# Patient Record
Sex: MALE | Race: WHITE | Employment: UNEMPLOYED | URBAN - METROPOLITAN AREA
[De-identification: names, ages, dates, MRNs, and addresses within clinical notes are randomized per-mention and may not be internally consistent; named-entity substitution may affect disease eponyms.]

---

## 2021-04-24 ENCOUNTER — HOSPITAL ENCOUNTER (INPATIENT)
Age: 61
LOS: 2 days | Discharge: HOME | DRG: 881 | End: 2021-04-26
Attending: PSYCHIATRY & NEUROLOGY | Admitting: PSYCHIATRY & NEUROLOGY

## 2021-04-24 PROBLEM — F32.9 MAJOR DEPRESSIVE DISORDER: Status: ACTIVE | Noted: 2021-04-24

## 2021-04-24 PROCEDURE — 65220000003 HC RM SEMIPRIVATE PSYCH

## 2021-04-24 PROCEDURE — 74011250637 HC RX REV CODE- 250/637: Performed by: NURSE PRACTITIONER

## 2021-04-24 RX ORDER — HYDROXYZINE 50 MG/1
50 TABLET, FILM COATED ORAL
Status: DISCONTINUED | OUTPATIENT
Start: 2021-04-24 | End: 2021-04-26 | Stop reason: HOSPADM

## 2021-04-24 RX ORDER — LISINOPRIL 40 MG/1
40 TABLET ORAL DAILY
COMMUNITY
End: 2021-04-26 | Stop reason: HOSPADM

## 2021-04-24 RX ORDER — OLANZAPINE 5 MG/1
5 TABLET ORAL
Status: DISCONTINUED | OUTPATIENT
Start: 2021-04-24 | End: 2021-04-26 | Stop reason: HOSPADM

## 2021-04-24 RX ORDER — BENZTROPINE MESYLATE 1 MG/1
1 TABLET ORAL
Status: DISCONTINUED | OUTPATIENT
Start: 2021-04-24 | End: 2021-04-26 | Stop reason: HOSPADM

## 2021-04-24 RX ORDER — METOPROLOL SUCCINATE 25 MG/1
25 TABLET, EXTENDED RELEASE ORAL DAILY
Status: DISCONTINUED | OUTPATIENT
Start: 2021-04-25 | End: 2021-04-26 | Stop reason: HOSPADM

## 2021-04-24 RX ORDER — LANOLIN ALCOHOL/MO/W.PET/CERES
100 CREAM (GRAM) TOPICAL DAILY
Status: DISCONTINUED | OUTPATIENT
Start: 2021-04-25 | End: 2021-04-26 | Stop reason: HOSPADM

## 2021-04-24 RX ORDER — PHENOBARBITAL 32.4 MG/1
32.4 TABLET ORAL 4 TIMES DAILY
Status: COMPLETED | OUTPATIENT
Start: 2021-04-24 | End: 2021-04-25

## 2021-04-24 RX ORDER — ASPIRIN 325 MG
325 TABLET ORAL DAILY
Status: DISCONTINUED | OUTPATIENT
Start: 2021-04-25 | End: 2021-04-26 | Stop reason: HOSPADM

## 2021-04-24 RX ORDER — METOPROLOL TARTRATE 100 MG/1
100 TABLET ORAL DAILY
COMMUNITY
End: 2021-04-26 | Stop reason: HOSPADM

## 2021-04-24 RX ORDER — CLONAZEPAM 0.5 MG/1
0.5 TABLET ORAL
COMMUNITY
End: 2021-04-26 | Stop reason: HOSPADM

## 2021-04-24 RX ORDER — ADHESIVE BANDAGE
30 BANDAGE TOPICAL DAILY PRN
Status: DISCONTINUED | OUTPATIENT
Start: 2021-04-24 | End: 2021-04-26 | Stop reason: HOSPADM

## 2021-04-24 RX ORDER — PHENOBARBITAL 32.4 MG/1
32.4 TABLET ORAL
Status: ACTIVE | OUTPATIENT
Start: 2021-04-24 | End: 2021-04-26

## 2021-04-24 RX ORDER — TADALAFIL 10 MG/1
10 TABLET ORAL DAILY
COMMUNITY
End: 2021-04-26 | Stop reason: HOSPADM

## 2021-04-24 RX ORDER — PANTOPRAZOLE SODIUM 40 MG/1
40 TABLET, DELAYED RELEASE ORAL
Status: DISCONTINUED | OUTPATIENT
Start: 2021-04-25 | End: 2021-04-26 | Stop reason: HOSPADM

## 2021-04-24 RX ORDER — HALOPERIDOL 5 MG/ML
5 INJECTION INTRAMUSCULAR
Status: DISCONTINUED | OUTPATIENT
Start: 2021-04-24 | End: 2021-04-26 | Stop reason: HOSPADM

## 2021-04-24 RX ORDER — IBUPROFEN 200 MG
1 TABLET ORAL DAILY
Status: DISCONTINUED | OUTPATIENT
Start: 2021-04-25 | End: 2021-04-26 | Stop reason: HOSPADM

## 2021-04-24 RX ORDER — PHENOBARBITAL 32.4 MG/1
32.4 TABLET ORAL 2 TIMES DAILY
Status: COMPLETED | OUTPATIENT
Start: 2021-04-25 | End: 2021-04-26

## 2021-04-24 RX ORDER — HYDROCHLOROTHIAZIDE 25 MG/1
12.5 TABLET ORAL DAILY
Status: DISCONTINUED | OUTPATIENT
Start: 2021-04-25 | End: 2021-04-26 | Stop reason: HOSPADM

## 2021-04-24 RX ORDER — ACETAMINOPHEN 325 MG/1
650 TABLET ORAL
Status: DISCONTINUED | OUTPATIENT
Start: 2021-04-24 | End: 2021-04-26 | Stop reason: HOSPADM

## 2021-04-24 RX ORDER — THERA TABS 400 MCG
1 TAB ORAL DAILY
Status: DISCONTINUED | OUTPATIENT
Start: 2021-04-25 | End: 2021-04-26 | Stop reason: HOSPADM

## 2021-04-24 RX ORDER — DIAZEPAM 5 MG/1
5 TABLET ORAL
Status: DISCONTINUED | OUTPATIENT
Start: 2021-04-24 | End: 2021-04-25

## 2021-04-24 RX ORDER — FOLIC ACID 1 MG/1
1 TABLET ORAL DAILY
Status: DISCONTINUED | OUTPATIENT
Start: 2021-04-25 | End: 2021-04-26 | Stop reason: HOSPADM

## 2021-04-24 RX ORDER — PHENOBARBITAL 32.4 MG/1
16.2 TABLET ORAL
Status: DISCONTINUED | OUTPATIENT
Start: 2021-04-26 | End: 2021-04-26 | Stop reason: HOSPADM

## 2021-04-24 RX ORDER — DIPHENHYDRAMINE HYDROCHLORIDE 50 MG/ML
50 INJECTION, SOLUTION INTRAMUSCULAR; INTRAVENOUS
Status: DISCONTINUED | OUTPATIENT
Start: 2021-04-24 | End: 2021-04-26 | Stop reason: HOSPADM

## 2021-04-24 RX ORDER — LISINOPRIL 20 MG/1
20 TABLET ORAL DAILY
Status: DISCONTINUED | OUTPATIENT
Start: 2021-04-25 | End: 2021-04-25

## 2021-04-24 RX ORDER — LORAZEPAM 2 MG/ML
1 INJECTION INTRAMUSCULAR
Status: DISCONTINUED | OUTPATIENT
Start: 2021-04-24 | End: 2021-04-26 | Stop reason: HOSPADM

## 2021-04-24 RX ORDER — CALCIUM CARBONATE 200(500)MG
200 TABLET,CHEWABLE ORAL
Status: DISCONTINUED | OUTPATIENT
Start: 2021-04-24 | End: 2021-04-26 | Stop reason: HOSPADM

## 2021-04-24 RX ORDER — HYDROCHLOROTHIAZIDE 25 MG/1
25 TABLET ORAL DAILY
COMMUNITY
End: 2021-04-26 | Stop reason: HOSPADM

## 2021-04-24 RX ORDER — TRAZODONE HYDROCHLORIDE 50 MG/1
50 TABLET ORAL
Status: DISCONTINUED | OUTPATIENT
Start: 2021-04-24 | End: 2021-04-26 | Stop reason: HOSPADM

## 2021-04-24 RX ORDER — PHENOBARBITAL 32.4 MG/1
16.2 TABLET ORAL 2 TIMES DAILY
Status: DISCONTINUED | OUTPATIENT
Start: 2021-04-26 | End: 2021-04-26 | Stop reason: HOSPADM

## 2021-04-24 RX ADMIN — HYDROXYZINE HYDROCHLORIDE 50 MG: 50 TABLET, FILM COATED ORAL at 13:53

## 2021-04-24 RX ADMIN — PHENOBARBITAL 32.4 MG: 32.4 TABLET ORAL at 22:03

## 2021-04-24 RX ADMIN — PHENOBARBITAL 32.4 MG: 32.4 TABLET ORAL at 17:57

## 2021-04-24 RX ADMIN — PHENOBARBITAL 32.4 MG: 32.4 TABLET ORAL at 13:53

## 2021-04-24 RX ADMIN — HYDROXYZINE HYDROCHLORIDE 50 MG: 50 TABLET, FILM COATED ORAL at 17:59

## 2021-04-24 RX ADMIN — OLANZAPINE 5 MG: 5 TABLET, FILM COATED ORAL at 17:59

## 2021-04-24 NOTE — BH NOTES
1220- Pt arrives to the unit alert oriented. Demanding irritable. Primary Nurse Wesley Borges and Amarilis Mariee, RN performed a dual skin assessment on this patient No impairment noted  Nick score is 23    1232- pt states he need ativan or librium. Education provided. Pt states he needs to be discharged. Education provided related to detox. Pt states he will try detox. Order to have pt assessed if pt wants to leave. Pt on CIWA monitoring. Voluntary. 1440- Hospitalist will see pt. Allegra Mckeon MD.     Regine Salcedo from ED on pt paper chart.

## 2021-04-24 NOTE — PROGRESS NOTES
Problem: Depressed Mood (Adult/Pediatric)  Goal: *STG: Verbalizes anger, guilt, and other feelings in a constructive manor  Outcome: Not Progressing Towards Goal  Goal: *STG: Attends activities and groups  Outcome: Not Progressing Towards Goal  Goal: *STG: Demonstrates reduction in symptoms and increase in insight into coping skills/future focused  Outcome: Not Progressing Towards Goal    1630: Patient received awake and lying quietly in the bed. Mood and affect; dull, flat, however cooperative. Patient has remained isolative to room majority of the shift, and voiced complaints of not feeling well and \"shaky\". Writer educated patient on benefits of taking Phenobarbital to alleviate withdrawal symptoms. Denies SI/HI. Will continue to monitor q15 minutes for safety checks.

## 2021-04-24 NOTE — BH NOTES
TRANSFER - IN REPORT:    Verbal report received from ROGER Courtney(name) on Enrique Roberto  being received from [address]) for routine progression of care      Report consisted of patients Situation, Background, Assessment and   Recommendations(SBAR). Information from the following report(s) SBAR was reviewed with the receiving nurse. Opportunity for questions and clarification was provided. Assessment completed upon patients arrival to unit and care assumed.

## 2021-04-24 NOTE — CONSULTS
[facility]    H[facility] Consult  Primary Care Provider: None  Consult requested by: Cha Ibarra NP    Subjective:     Jaspreet Roberts is a 63 y.o. male who presents for admission to the inpatient behavioral health unit for major depression DO, alcohol abuse,  and suicidal ideation. He really does not have any complaints for me regarding any medical issues - just mood related complaints- he in interested in leaving in the morning due to inadequate control of his symptoms. He denies check pain or shortness of breath- no N/V, no abd pain, no constipation or diarrhea. He lives in [address] [place] [address] [facility] [employer]. He tells me that his wife kicked him out of the house and has another man living in the house. He admits to vaping and drinking lots of alcohol. Urine UDS from [facility] + for marijuana and methamphetamines. Review of Systems:    A comprehensive review of systems was negative except for that written in the History of Present Illness. PMH- per records- HTN, tobacco and Etoh abuse, Back pain, erectile dysfunction, nasal polyps, past COVID infection 7/2020, past traumatic fracture of Left tibia- medial malleolus; Past surgical Hx- appendectomy      Prior to Admission medications    Medication Sig Start Date End Date Taking? Authorizing Provider   metoprolol tartrate (LOPRESSOR) 100 mg IR tablet Take 100 mg by mouth daily. Yes Provider, Historical   hydroCHLOROthiazide (HYDRODIURIL) 25 mg tablet Take 25 mg by mouth daily. Yes Provider, Historical   tadalafiL (Cialis) 10 mg tablet Take 10 mg by mouth daily. Yes Provider, Historical   lisinopriL (PRINIVIL, ZESTRIL) 40 mg tablet Take 40 mg by mouth daily. Yes Provider, Historical   clonazePAM (KlonoPIN) 0.5 mg tablet Take 0.5 mg by mouth nightly as needed for Anxiety.  Indications: panic disorder   Yes Provider, Historical     Allergies to Tramadol and propoxyphene    Family Hx- mother with \"mental issues\"       Father with HTN   Siblings healthy  Patient is - wife's name Chelsey Vidal [phone]  He has 6 children from his wife and 2 other children from another person      SOCIAL HISTORY:  Patient resides at [place] [address] in a hotel,  as above  Patient ambulates independently   Smoking history: patient admits to vaping  Alcohol history: recent heavy alcohol use and per UDS- drug use- marajuana and meth    Objective:       Physical Exam:   Visit Vitals  /82   Pulse 88   Temp 98.5 °F (36.9 °C)   Resp 16   SpO2 99%     General:  Alert, cooperative, no distress, appears stated age. Head:  Normocephalic, without obvious abnormality, atraumatic. Eyes:  Conjunctivae/corneas clear. PERRL, EOMs intact. Throat: Lips, mucosa, and tongue normal. Dental caries   Neck: Supple, symmetrical, trachea midline, no adenopathy, thyroid: no enlargement/tenderness/nodules, no carotid bruit and no JVD. Back:   Symmetric, no curvature. ROM normal. No CVA tenderness. Lungs:   Clear to auscultation bilaterally. Chest wall:  No tenderness or deformity. Heart:  Regular rate and rhythm, S1, S2 normal, no murmur, cl. Abdomen:   Soft, non-tender. Mild distension. No masses,    Extremities: Trace LE edema. normal muscle tone no tremors   Pulses: 2+ and symmetric all extremities. Skin: Skin color, texture, turgor normal. No rashes or lesions   Lymph nodes: Cervical, supraclavicular, nodes normal.   Neurologic: CNII-XII intact. Normal strength, sensation  Throughout. Psych: depressed mood and anxious       ECG:  From outside hospital reviewed- NSR with ST segment changes consistent with prior infarcts    Data Review: All diagnostic labs and studies have been reviewed. Labs from outside hospital reviewed- CBC- normal     CMP- showing Na of 135, creatinine elevated 1.4; LFTs and Bilirubin were normal    Troponin was negative    Assessment:   Russ Cam is a 63 y.o. male who presents with MDD and Suicidal ideation.  We are asked to see the patient in consult to assist in managing his medical issues which are listed below. Active Problems:    Major depressive disorder (4/24/2021)        Plan:     1. MDD/ Suicidal ideation- management per behavioral health team  - added valium at patient request for benzo withdrawal symptoms- he was getting ativan prior to admission    2. Etoh abuse - no current evidence for withdrawal- cont phenobarbital as ordered  Check ammonia levels in the am   3. CV- HTN- EKG evidence of past ischemia- resume prior meds:  4. Elevated creatinine 1.4- unclear baseline, resume lisinopril and HCTZ and monitor creatinine  5. Hyponatremia- mild and not needing tx  6.  Nicotine dependence- Vaping prior to admission- nicotine patch      Signed By: Roseanna Barfield MD     Date of Service:  4/24/2021

## 2021-04-24 NOTE — PROGRESS NOTES
Problem: Depressed Mood (Adult/Pediatric)  Goal: *STG: Remains safe in hospital  Outcome: Progressing Towards Goal  Goal: Interventions  Outcome: Progressing Towards Goal     Problem: Anxiety-Behavioral Health (Adult/Pediatric)  Goal: *STG: Seeks staff when feelings of anxiety and fear arise  Outcome: Progressing Towards Goal  Goal: Interventions  Outcome: Progressing Towards Goal

## 2021-04-25 LAB
AMMONIA PLAS-SCNC: 26 UMOL/L
ANION GAP SERPL CALC-SCNC: 6 MMOL/L (ref 5–15)
BUN SERPL-MCNC: 26 MG/DL (ref 6–20)
BUN/CREAT SERPL: 21 (ref 12–20)
CALCIUM SERPL-MCNC: 9.4 MG/DL (ref 8.5–10.1)
CHLORIDE SERPL-SCNC: 109 MMOL/L (ref 97–108)
CHOLEST SERPL-MCNC: 185 MG/DL
CO2 SERPL-SCNC: 25 MMOL/L (ref 21–32)
CREAT SERPL-MCNC: 1.23 MG/DL (ref 0.7–1.3)
ERYTHROCYTE [DISTWIDTH] IN BLOOD BY AUTOMATED COUNT: 13.8 % (ref 11.5–14.5)
GLUCOSE P FAST SERPL-MCNC: 94 MG/DL (ref 65–100)
GLUCOSE SERPL-MCNC: 94 MG/DL (ref 65–100)
HCT VFR BLD AUTO: 41.2 % (ref 36.6–50.3)
HDLC SERPL-MCNC: 36 MG/DL
HDLC SERPL: 5.1 {RATIO} (ref 0–5)
HGB BLD-MCNC: 13.7 G/DL (ref 12.1–17)
LDLC SERPL CALC-MCNC: 114.2 MG/DL (ref 0–100)
LIPID PROFILE,FLP: ABNORMAL
MAGNESIUM SERPL-MCNC: 2.2 MG/DL (ref 1.6–2.4)
MCH RBC QN AUTO: 29.7 PG (ref 26–34)
MCHC RBC AUTO-ENTMCNC: 33.3 G/DL (ref 30–36.5)
MCV RBC AUTO: 89.4 FL (ref 80–99)
NRBC # BLD: 0 K/UL (ref 0–0.01)
NRBC BLD-RTO: 0 PER 100 WBC
PLATELET # BLD AUTO: 299 K/UL (ref 150–400)
PMV BLD AUTO: 9 FL (ref 8.9–12.9)
POTASSIUM SERPL-SCNC: 4 MMOL/L (ref 3.5–5.1)
RBC # BLD AUTO: 4.61 M/UL (ref 4.1–5.7)
SODIUM SERPL-SCNC: 140 MMOL/L (ref 136–145)
TRIGL SERPL-MCNC: 174 MG/DL (ref ?–150)
TSH SERPL DL<=0.05 MIU/L-ACNC: 0.23 UIU/ML (ref 0.36–3.74)
VLDLC SERPL CALC-MCNC: 34.8 MG/DL
WBC # BLD AUTO: 6.3 K/UL (ref 4.1–11.1)

## 2021-04-25 PROCEDURE — 80048 BASIC METABOLIC PNL TOTAL CA: CPT

## 2021-04-25 PROCEDURE — 83735 ASSAY OF MAGNESIUM: CPT

## 2021-04-25 PROCEDURE — 80061 LIPID PANEL: CPT

## 2021-04-25 PROCEDURE — 36415 COLL VENOUS BLD VENIPUNCTURE: CPT

## 2021-04-25 PROCEDURE — 82140 ASSAY OF AMMONIA: CPT

## 2021-04-25 PROCEDURE — 74011250637 HC RX REV CODE- 250/637: Performed by: NURSE PRACTITIONER

## 2021-04-25 PROCEDURE — 85027 COMPLETE CBC AUTOMATED: CPT

## 2021-04-25 PROCEDURE — 65220000003 HC RM SEMIPRIVATE PSYCH

## 2021-04-25 PROCEDURE — 82947 ASSAY GLUCOSE BLOOD QUANT: CPT

## 2021-04-25 PROCEDURE — 74011250637 HC RX REV CODE- 250/637: Performed by: FAMILY MEDICINE

## 2021-04-25 PROCEDURE — 84443 ASSAY THYROID STIM HORMONE: CPT

## 2021-04-25 RX ORDER — LISINOPRIL 20 MG/1
40 TABLET ORAL DAILY
Status: DISCONTINUED | OUTPATIENT
Start: 2021-04-26 | End: 2021-04-26 | Stop reason: HOSPADM

## 2021-04-25 RX ADMIN — HYDROXYZINE HYDROCHLORIDE 50 MG: 50 TABLET, FILM COATED ORAL at 20:55

## 2021-04-25 RX ADMIN — PANTOPRAZOLE SODIUM 40 MG: 40 TABLET, DELAYED RELEASE ORAL at 06:55

## 2021-04-25 RX ADMIN — PHENOBARBITAL 32.4 MG: 32.4 TABLET ORAL at 17:15

## 2021-04-25 RX ADMIN — ASPIRIN 325 MG ORAL TABLET 325 MG: 325 PILL ORAL at 08:00

## 2021-04-25 RX ADMIN — LISINOPRIL 20 MG: 20 TABLET ORAL at 08:01

## 2021-04-25 RX ADMIN — FOLIC ACID 1 MG: 1 TABLET ORAL at 08:00

## 2021-04-25 RX ADMIN — PHENOBARBITAL 32.4 MG: 32.4 TABLET ORAL at 08:01

## 2021-04-25 RX ADMIN — HYDROCHLOROTHIAZIDE 12.5 MG: 25 TABLET ORAL at 08:00

## 2021-04-25 RX ADMIN — HYDROXYZINE HYDROCHLORIDE 50 MG: 50 TABLET, FILM COATED ORAL at 13:13

## 2021-04-25 RX ADMIN — METOPROLOL SUCCINATE 25 MG: 25 TABLET, EXTENDED RELEASE ORAL at 08:01

## 2021-04-25 RX ADMIN — THERA TABS 1 TABLET: TAB at 08:00

## 2021-04-25 RX ADMIN — Medication 100 MG: at 08:00

## 2021-04-25 RX ADMIN — TRAZODONE HYDROCHLORIDE 50 MG: 50 TABLET ORAL at 20:55

## 2021-04-25 RX ADMIN — PANTOPRAZOLE SODIUM 40 MG: 40 TABLET, DELAYED RELEASE ORAL at 16:31

## 2021-04-25 NOTE — PROGRESS NOTES
Problem: Falls - Risk of  Goal: *Absence of Falls  Description: Document Gary Miner Fall Risk and appropriate interventions in the flowsheet. Outcome: Progressing Towards Goal  Note:   Patient received resting in bed with eyes closed. No complaints, No distress noted. Safety measures in place. Will continue to monitor with q15min rounds.         Fall Risk Interventions:            Medication Interventions: Teach patient to arise slowly

## 2021-04-25 NOTE — PROGRESS NOTES
Problem: Depressed Mood (Adult/Pediatric)  Goal: *STG: Participates in treatment plan  Outcome: Progressing Towards Goal  Goal: *STG: Remains safe in hospital  Outcome: Progressing Towards Goal  Goal: Interventions  Outcome: Progressing Towards Goal     Problem: Patient Education: Go to Patient Education Activity  Goal: Patient/Family Education  Outcome: Progressing Towards Goal

## 2021-04-25 NOTE — PROGRESS NOTES
Problem: Depressed Mood (Adult/Pediatric)  Goal: Interventions  Outcome: Progressing Towards Goal  Note: CIWA monitoring. Medication meal compliant. Labile mood. Not attending groups. Isolative to his room.       Problem: Depressed Mood (Adult/Pediatric)  Goal: *STG: Complies with medication therapy  Outcome: Progressing Towards Goal     Problem: Depressed Mood (Adult/Pediatric)  Goal: *STG: Remains safe in hospital  4/25/2021 1057 by Ferrel Jeans  Outcome: Progressing Towards Goal  4/25/2021 1042 by Ferrel Jeans  Outcome: Progressing Towards Goal     Problem: Depressed Mood (Adult/Pediatric)  Goal: *STG: Attends activities and groups  4/25/2021 1057 by Ferrel Jeans  Outcome: Not Progressing Towards Goal    [facility]  Master Treatment Plan for Alka Enriquez    Date Treatment Plan Initiated: 4/25/2021    Treatment Plan Modalities:  Type of Modality Amount  (x minutes) Frequency (x/week) Duration (x days) Name of Responsible Staff   [address] [facility] meetings to encourage peer interactions 15 7 1   Abdi LEUNG   Group psychotherapy to assist in building coping skills and internal controls 60 7 1 Jordan Kaye   Therapeutic activity groups to build coping skills 60 7 1 Jordan Kaye   Psychoeducation in group setting to address:   Medication education   [address] PharmD   Coping skills   30 3 1 Jordan Kaye   Relaxation techniques         Symptom management         Discharge planning   [address] 3 2 1 Chaplain JAIN   60 1 1 volunteer   Recovery/AA/NA      volunteer   Physician medication management   15 7 1 Dr Collette Cones NP, [address] MAYES RN   Family meeting/discharge planning   15 2 [address] [facility] and [facility]

## 2021-04-25 NOTE — H&P
Psychiatry History and Physical    Subjective:     Date of Evaluation:  4/25/2021    History of Presenting Problem: Dipti Ang is a 63year old male admitted to the [facility] for SI and alcohol abuse. Does not have outpatient resources. Had been sober for a year and a half prior to a month ago. Also using methamphetamine. Per chart, he got back together with his wife about a month ago and was using with her, they then  again and he has been living in a hotel. Mood \"up and down. \" denies current SI. Had endorsed SI in the ED prior to admission but now says \"not really. \" He attends meeting at his local [employer] which he finds helpful. Drinks 1.5 40 oz [employer] daily. Appears to be in no obvious distress during interview. Patient Active Problem List    Diagnosis Date Noted    Major depressive disorder 04/24/2021     No past medical history on file. No past surgical history on file. No family history on file. Social History     Tobacco Use    Smoking status: Current Every Day Smoker     Packs/day: 0.50     Years: 5.00     Pack years: 2.50    Smokeless tobacco: Never Used   Substance Use Topics    Alcohol use: Not on file     Prior to Admission medications    Medication Sig Start Date End Date Taking? Authorizing Provider   metoprolol tartrate (LOPRESSOR) 100 mg IR tablet Take 100 mg by mouth daily. Yes Provider, Historical   hydroCHLOROthiazide (HYDRODIURIL) 25 mg tablet Take 25 mg by mouth daily. Yes Provider, Historical   tadalafiL (Cialis) 10 mg tablet Take 10 mg by mouth daily. Yes Provider, Historical   lisinopriL (PRINIVIL, ZESTRIL) 40 mg tablet Take 40 mg by mouth daily. Yes Provider, Historical   clonazePAM (KlonoPIN) 0.5 mg tablet Take 0.5 mg by mouth nightly as needed for Anxiety.  Indications: panic disorder   Yes Provider, Historical     No Known Allergies       Objective:     Patient Vitals for the past 8 hrs:   BP Temp Pulse Resp SpO2 Height   04/25/21 1152      6' 2\" (1.88 m)   04/25/21 0752 (!) 143/100 97.3 °F (36.3 °C) 85 15 95 %        Mental Status exam: WNL except for    Sensorium  oriented to time, place and person   Orientation person, place, time/date and situation   Relations cooperative   Eye Contact appropriate   Appearance:  age appropriate   Motor Behavior:  within normal limits   Speech:  normal pitch and normal volume   Vocabulary average   Thought Process: within normal limits   Thought Content not internally preoccupied    Suicidal ideations none   Homicidal ideations none   Mood:  depressed   Affect:  blunted   Memory recent  adequate   Memory remote:  adequate   Concentration:  adequate   Abstraction:  abstract   Insight:  fair   Reliability fair   Judgment:  fair         Impression:      Active Problems:    Major depressive disorder (4/24/2021)          Plan:     Admit to [address] further information  Medication management  Disposition planning with social work  Estimated length of stay 5-7 days    I certify that this patients inpatient psychiatric hospital services furnished since the previous certification were, and continue to be, required for treatment that could reasonably be expected to improve the patient's condition, or for diagnostic study, and that the patient continues to need, on a daily basis, active treatment furnished directly by or requiring the supervision of inpatient psychiatric facility personnel. In addition, the hospital records show that services furnished were intensive treatment services, admission or related services, or equivalent services.

## 2021-04-25 NOTE — BH NOTES
PRN Medication Documentation    Specific patient behavior that led to need for PRN medication: Anxious  Staff interventions attempted prior to PRN being given: Communication about drug dependency and med seeking behavior. Patient still states and is adamant about receiving Atarax or per hospitalist \"he ordered me Valium\"  PRN medication given: Atarax 50 mg given at [id number]  Patient response/effectiveness of PRN medication: TBD    1400 - There was no anxiety prior so patient is baseline.

## 2021-04-25 NOTE — BH NOTES
GROUP THERAPY PROGRESS NOTE    Patient did not participate in Recreational Group Therapy.      Jordan Kaye, Supervisee in Social Work

## 2021-04-25 NOTE — BH NOTES
PSYCHOSOCIAL ASSESSMENT  :Patient identifying info:   Alka Enriquez is a 63 y.o., male admitted 4/24/2021 12:25 PM     Presenting problem and precipitating factors: Patient was admitted to the ED for SI, depression and alcohol abuse. Pt reported that his wife of many years kicked him out of the house, and she currently is living with another man. Patient was sober for over a year and a half until this past month. He currently lives in a hotel in [place], [place]. Mental status assessment: Alert and oriented. Denied SI. Pt reported his mood is \"up and down. \"     Strengths: Patient is seeking voluntary treatment. Collateral information: Princess Irene/daughter [phone] Declined to sign SADIA 4/25/21. Current psychiatric /substance abuse providers and contact info: none noted     Previous psychiatric/substance abuse providers and response to treatment: none noted     Family history of mental illness or substance abuse: none noted     Substance abuse history:    Social History     Tobacco Use    Smoking status: Not on file   Substance Use Topics    Alcohol use: Not on file       History of biomedical complications associated with substance abuse: none noted     Patient's current acceptance of treatment or motivation for change: voluntary admission     Family constellation: patient is . Is significant other involved? No       Describe support system: none noted     Describe living arrangements and home environment: homeless-lives in a hotel.      Health issues:   Hospital Problems  Never Reviewed          Codes Class Noted POA    Major depressive disorder ICD-10-CM: F32.9  ICD-9-CM: 296.20  4/24/2021 Unknown              Trauma history: none noted     Legal issues: none noted     History of  service: none noted     Financial status: none noted     Pentecostalism/cultural factors: none noted      Education/work history: none noted     Have you been licensed as a health care professional (current or ): No     Leisure and recreation preferences: none noted     Describe coping skills: limited, ineffectual     Jordan Kaye  2021

## 2021-04-25 NOTE — BH NOTES
GROUP THERAPY PROGRESS NOTE    Patient did not participate in Self-Care Group.      Jordan Kaye, Supervisee in Social Work

## 2021-04-26 VITALS
DIASTOLIC BLOOD PRESSURE: 97 MMHG | HEIGHT: 74 IN | BODY MASS INDEX: 24.24 KG/M2 | HEART RATE: 70 BPM | SYSTOLIC BLOOD PRESSURE: 158 MMHG | TEMPERATURE: 97.9 F | WEIGHT: 188.9 LBS | RESPIRATION RATE: 16 BRPM | OXYGEN SATURATION: 97 %

## 2021-04-26 PROCEDURE — 74011250637 HC RX REV CODE- 250/637: Performed by: NURSE PRACTITIONER

## 2021-04-26 PROCEDURE — 74011250637 HC RX REV CODE- 250/637: Performed by: FAMILY MEDICINE

## 2021-04-26 RX ADMIN — FOLIC ACID 1 MG: 1 TABLET ORAL at 08:31

## 2021-04-26 RX ADMIN — PANTOPRAZOLE SODIUM 40 MG: 40 TABLET, DELAYED RELEASE ORAL at 06:21

## 2021-04-26 RX ADMIN — METOPROLOL SUCCINATE 25 MG: 25 TABLET, EXTENDED RELEASE ORAL at 08:31

## 2021-04-26 RX ADMIN — HYDROCHLOROTHIAZIDE 12.5 MG: 25 TABLET ORAL at 08:31

## 2021-04-26 RX ADMIN — PHENOBARBITAL 32.4 MG: 32.4 TABLET ORAL at 08:32

## 2021-04-26 RX ADMIN — Medication 100 MG: at 08:31

## 2021-04-26 RX ADMIN — THERA TABS 1 TABLET: TAB at 08:31

## 2021-04-26 RX ADMIN — LISINOPRIL 40 MG: 20 TABLET ORAL at 08:31

## 2021-04-26 NOTE — DISCHARGE SUMMARY
DISCHARGE SUMMARY    Some parts of the discharge summary are from the initial Psychiatric interview that was done on admission by the admitting psychiatrist.     Date of Admission: 4/24/2021    Date of Discharge: 4/26/2021     TYPE OF DISCHARGE:     AMA - YES  RELEASED BY THE TDO COURT - NO    REGULAR - NO    Date of Evaluation:  4/25/2021     History of Presenting Problem: Blanca Sears is a 63year old male admitted to the [facility] for SI and alcohol abuse. Does not have outpatient resources. Had been sober for a year and a half prior to a month ago. Also using methamphetamine. Per chart, he got back together with his wife about a month ago and was using with her, they then  again and he has been living in a hotel. Mood \"up and down. \" denies current SI. Had endorsed SI in the ED prior to admission but now says \"not really. \" He attends meeting at his local [employer] which he finds helpful. Drinks 1.5 40 oz [employer] daily. Appears to be in no obvious distress during interview.           Patient Active Problem List     Diagnosis Date Noted    Major depressive disorder 04/24/2021      No past medical history on file. No past surgical history on file. No family history on file. Social History            Tobacco Use    Smoking status: Current Every Day Smoker       Packs/day: 0.50       Years: 5.00       Pack years: 2.50    Smokeless tobacco: Never Used   Substance Use Topics    Alcohol use: Not on file              Prior to Admission medications    Medication Sig Start Date End Date Taking?  Authorizing Provider   metoprolol tartrate (LOPRESSOR) 100 mg IR tablet Take 100 mg by mouth daily.     Yes Provider, Historical   hydroCHLOROthiazide (HYDRODIURIL) 25 mg tablet Take 25 mg by mouth daily.     Yes Provider, Historical   tadalafiL (Cialis) 10 mg tablet Take 10 mg by mouth daily.     Yes Provider, Historical   lisinopriL (PRINIVIL, ZESTRIL) 40 mg tablet Take 40 mg by mouth daily.     Yes Provider, Historical   clonazePAM (KlonoPIN) 0.5 mg tablet Take 0.5 mg by mouth nightly as needed for Anxiety. Indications: panic disorder     Yes Provider, Historical      No Known Allergies         Objective:      Patient Vitals for the past 8 hrs:    BP Temp Pulse Resp SpO2 Height   04/25/21 1152      6' 2\" (1.88 m)   04/25/21 0752 (!) 143/100 97.3 °F (36.3 °C) 85 15 95 %          Mental Status exam: WNL except for    Sensorium  oriented to time, place and person   Orientation person, place, time/date and situation   Relations cooperative   Eye Contact appropriate   Appearance:  age appropriate   Motor Behavior:  within normal limits   Speech:  normal pitch and normal volume   Vocabulary average   Thought Process: within normal limits   Thought Content not internally preoccupied    Suicidal ideations none   Homicidal ideations none   Mood:  depressed   Affect:  blunted   Memory recent  adequate   Memory remote:  adequate   Concentration:  adequate   Abstraction:  abstract   Insight:  fair   Reliability fair   Judgment:  fair            Impression:       Active Problems:    Major depressive disorder (4/24/2021)              Plan:      Admit to [facility] [address] further information  Medication management  Disposition planning with social work  Estimated length of stay 5-7 days     I certify that this patients inpatient psychiatric hospital services furnished since the previous certification were, and continue to be, required for treatment that could reasonably be expected to improve the patient's condition, or for diagnostic study, and that the patient continues to need, on a daily basis, active treatment furnished directly by or requiring the supervision of inpatient psychiatric facility personnel. In addition, the hospital records show that services furnished were intensive treatment services, admission or related services, or equivalent services.    Date of Evaluation:  4/25/2021     History of Presenting Problem: Raquel Helm Kaela Cuenca is a 63year old male admitted to the [facility] for SI and alcohol abuse. Does not have outpatient resources. Had been sober for a year and a half prior to a month ago. Also using methamphetamine. Per chart, he got back together with his wife about a month ago and was using with her, they then  again and he has been living in a hotel. Mood \"up and down. \" denies current SI. Had endorsed SI in the ED prior to admission but now says \"not really. \" He attends meeting at his local [employer] which he finds helpful. Drinks 1.5 40 oz [employer] daily. Appears to be in no obvious distress during interview.           Patient Active Problem List     Diagnosis Date Noted    Major depressive disorder 04/24/2021      No past medical history on file. No past surgical history on file. No family history on file. Social History            Tobacco Use    Smoking status: Current Every Day Smoker       Packs/day: 0.50       Years: 5.00       Pack years: 2.50    Smokeless tobacco: Never Used   Substance Use Topics    Alcohol use: Not on file              Prior to Admission medications    Medication Sig Start Date End Date Taking? Authorizing Provider   metoprolol tartrate (LOPRESSOR) 100 mg IR tablet Take 100 mg by mouth daily.     Yes Provider, Historical   hydroCHLOROthiazide (HYDRODIURIL) 25 mg tablet Take 25 mg by mouth daily.     Yes Provider, Historical   tadalafiL (Cialis) 10 mg tablet Take 10 mg by mouth daily.     Yes Provider, Historical   lisinopriL (PRINIVIL, ZESTRIL) 40 mg tablet Take 40 mg by mouth daily.     Yes Provider, Historical   clonazePAM (KlonoPIN) 0.5 mg tablet Take 0.5 mg by mouth nightly as needed for Anxiety.  Indications: panic disorder     Yes Provider, Historical      No Known Allergies         Objective:      Patient Vitals for the past 8 hrs:    BP Temp Pulse Resp SpO2 Height   04/25/21 1152      6' 2\" (1.88 m)   04/25/21 0752 (!) 143/100 97.3 °F (36.3 °C) 85 15 95 %        Mental Status exam: WNL except for    Sensorium  oriented to time, place and person   Orientation person, place, time/date and situation   Relations cooperative   Eye Contact appropriate   Appearance:  age appropriate   Motor Behavior:  within normal limits   Speech:  normal pitch and normal volume   Vocabulary average   Thought Process: within normal limits   Thought Content not internally preoccupied    Suicidal ideations none   Homicidal ideations none   Mood:  depressed   Affect:  blunted   Memory recent  adequate   Memory remote:  adequate   Concentration:  adequate   Abstraction:  abstract   Insight:  fair   Reliability fair   Judgment:  fair            Impression:       Active Problems:    Major depressive disorder (4/24/2021)              Plan:      Admit to [facility] further information  Medication management  Disposition planning with social work  Estimated length of stay 5-7 days     I certify that this patients inpatient psychiatric hospital services furnished since the previous certification were, and continue to be, required for treatment that could reasonably be expected to improve the patient's condition, or for diagnostic study, and that the patient continues to need, on a daily basis, active treatment furnished directly by or requiring the supervision of inpatient psychiatric facility personnel. In addition, the hospital records show that services furnished were intensive treatment services, admission or related services, or equivalent services. COURSE IN THE HOSPITAL:  Patient was admitted to the inpatient psychiatry unit for acute psychiatric stabilization in regards to symptomatology as described in the HPI above and placed on Q15 minute checks and withdrawal precautions. While on the unit Rodolfo Walsh was involved in individual, group, occupational and milieu therapy. Rodolfo Walsh  was started back on the patients usual medication regimen as well as PRN medications.  Luis Huber Maria Victoria was discharged AMA at their request. Patient was considered competent to make this decision and did not appear to be a danger to themselves or to others. I discussed the risks of withdrawal with Evangelista Pope including seizues, and possible death. Patient verbalized understanding and still want's to leave. There was no behavior indicating instability of their psychiatric symptoms and the patient appears to be able to make a reasonable judgment regarding their own care, manipulate this information, and indicate an appropriate choice. Evangelista Pope was discharged at their request with follow up to be arranged. No prescriptions were provided at the time of discharge. Patient is fully aware of the risks and benefits of staying in the hosptal for completion of treatment tvs. leaving the hospital AMA. Patient had expressed a desire to follow up with appointments and remains motivated to be in treatment after discharge. The patient verbalized understanding of discharge instructions. DISCHARGE DIAGNOSIS:   Major depressive disorder   Alcohol Use disorder, moderate. Current Discharge Medication List      STOP taking these medications       metoprolol tartrate (LOPRESSOR) 100 mg IR tablet Comments:   Reason for Stopping:         hydroCHLOROthiazide (HYDRODIURIL) 25 mg tablet Comments:   Reason for Stopping:         tadalafiL (Cialis) 10 mg tablet Comments:   Reason for Stopping:         lisinopriL (PRINIVIL, ZESTRIL) 40 mg tablet Comments:   Reason for Stopping:         clonazePAM (KlonoPIN) 0.5 mg tablet Comments:   Reason for Stopping: Follow-up Information     Follow up With Specialties Details Why Contact Info    None    None (395) Patient stated that they have no PCP          WOUND CARE: none needed. PROGNOSIS:   Fair / Guarded based on nature of patient's pathology/ies and treatment compliance issues.   Prognosis is greatly dependent upon patient's ability to  follow up on psychiatric/psychotherapy appointments as well as to comply with psychiatric medications as prescribed which the individual has declined to do.

## 2021-04-26 NOTE — BH NOTES
Behavioral Health Transition Record to Provider    Patient Name: Kenisha Frost  YOB: 1960  Medical Record Number: [id number]  Date of Admission: 4/24/2021  Date of Discharge: 4/26/2021     Attending Provider: No att. providers found  Discharging Provider: Dr. Vinay Aguirre   To contact this individual call [phone] and ask the  to page. If unavailable, ask to be transferred to [facility] Provider on call. [facility] Provider will be available on call 24/7 and during holidays. Primary Care Provider: None    No Known Allergies    Reason for Admission: History of Presenting Rosalinda Kwong is a 63year old male [facility] and alcohol abuse. Does not have outpatient resources. Had been sober for a year and a half prior to a month ago. Also using methamphetamine. Per chart, he got back together with his wife about a month ago and was using with her, they then  again and he has been living in a hotel. Mood \"up and down. \" denies current SI. Had endorsed SI in the ED prior to admission but now says \"not really. \" He attends meeting at his local [employer] which he finds helpful. Drinks 1.5 40 oz [employer] daily.  Appears to be in no obvious distress during interview.     Admission Diagnosis: Major depressive disorder [F32.9]    * No surgery found *    Results for orders placed or performed during the hospital encounter of 04/24/21   TSH 3RD GENERATION   Result Value Ref Range    TSH 0.23 (L) 0.36 - 3.74 uIU/mL   CBC W/O DIFF   Result Value Ref Range    WBC 6.3 4.1 - 11.1 K/uL    RBC 4.61 4.10 - 5.70 M/uL    HGB 13.7 12.1 - 17.0 g/dL    HCT 41.2 36.6 - 50.3 %    MCV 89.4 80.0 - 99.0 FL    MCH 29.7 26.0 - 34.0 PG    MCHC 33.3 30.0 - 36.5 g/dL    RDW 13.8 11.5 - 14.5 %    PLATELET 760 863 - 569 K/uL    MPV 9.0 8.9 - 12.9 FL    NRBC 0.0 0  WBC    ABSOLUTE NRBC 0.00 0.00 - 0.60 K/uL   METABOLIC PANEL, BASIC   Result Value Ref Range    Sodium 140 136 - 145 mmol/L    Potassium 4.0 3.5 - 5.1 mmol/L    Chloride 109 (H) 97 - 108 mmol/L    CO2 25 21 - 32 mmol/L    Anion gap 6 5 - 15 mmol/L    Glucose 94 65 - 100 mg/dL    BUN 26 (H) 6 - 20 MG/DL    Creatinine 1.23 0.70 - 1.30 MG/DL    BUN/Creatinine ratio 21 (H) 12 - 20      GFR est AA >60 >60 ml/min/1.73m2    GFR est non-AA 60 (L) >60 ml/min/1.73m2    Calcium 9.4 8.5 - 10.1 MG/DL   MAGNESIUM   Result Value Ref Range    Magnesium 2.2 1.6 - 2.4 mg/dL   AMMONIA   Result Value Ref Range    Ammonia 26 <32 UMOL/L   GLUCOSE, FASTING   Result Value Ref Range    Glucose 94 65 - 100 MG/DL   LIPID PANEL   Result Value Ref Range    LIPID PROFILE          Cholesterol, total 185 <200 MG/DL    Triglyceride 174 (H) <150 MG/DL    HDL Cholesterol 36 MG/DL    LDL, calculated 114.2 (H) 0 - 100 MG/DL    VLDL, calculated 34.8 MG/DL    CHOL/HDL Ratio 5.1 (H) 0.0 - 5.0         Immunizations administered during this encounter: There is no immunization history on file for this patient. Screening for Metabolic Disorders for Patients on Antipsychotic Medications  (Data obtained from the EMR)    Estimated Body Mass Index  Estimated body mass index is 24.25 kg/m² as calculated from the following:    Height as of this encounter: 6' 2\" (1.88 m). Weight as of this encounter: 85.7 kg (188 lb 14.4 oz).      Vital Signs/Blood Pressure  Visit Vitals  BP (!) 158/97   Pulse 70   Temp 97.9 °F (36.6 °C)   Resp 16   Ht 6' 2\" (1.88 m)   Wt 85.7 kg (188 lb 14.4 oz)   SpO2 97%   BMI 24.25 kg/m²       Blood Glucose/Hemoglobin A1c  Lab Results   Component Value Date/Time    Glucose 94 04/25/2021 06:42 AM    Glucose 94 04/25/2021 06:42 AM       No results found for: HBA1C, HGBE8, KKI8ULTS     Lipid Panel  Lab Results   Component Value Date/Time    Cholesterol, total 185 04/25/2021 06:42 AM    HDL Cholesterol 36 04/25/2021 06:42 AM    LDL, calculated 114.2 (H) 04/25/2021 06:42 AM    Triglyceride 174 (H) 04/25/2021 06:42 AM    CHOL/HDL Ratio 5.1 (H) 04/25/2021 06:42 AM        Discharge Diagnosis:Major depressive disorder (4/24/2021)    Discharge Plan: He [facility]. He denies SI, HI and no delusional thought content. The patient Artelia Drivers exhibits the ability to control behavior in a less restrictive environment. Patient's level of functioning is improving. No assaultive/destructive behavior has been observed for the past 24 hours. No suicidal/homicidal threat or behavior has been observed for the past 24 hours. There is no evidence of serious medication side effects. Patient has not been in physical or protective restraints for at least the past 24 hours. If weapons involved, how are they secured? No weapons involved     Is patient aware of and in agreement with discharge plan? He requested to leave AMA     Arrangements for medication:no prescriptions given to patient. He was discharge AMA     Copy of discharge instructions to provider?:  Yes, fax to [employer] [address]  Call [phone]  Our 24/7 crisis line. Initial Appointment  Call [phone]  For an initial appointment. Office Hours      Arrangements for transportation home: lyft to the bus station     Keep all follow up appointments as scheduled, continue to take prescribed medications per physician instructions.   Mental health crisis number:  884 or your local mental health crisis line number at [address]: [phone]    Discharge Medication List and Instructions:   Discharge Medication List as of 4/26/2021 12:37 PM      STOP taking these medications       metoprolol tartrate (LOPRESSOR) 100 mg IR tablet Comments:   Reason for Stopping:         hydroCHLOROthiazide (HYDRODIURIL) 25 mg tablet Comments:   Reason for Stopping:         tadalafiL (Cialis) 10 mg tablet Comments:   Reason for Stopping:         lisinopriL (PRINIVIL, ZESTRIL) 40 mg tablet Comments:   Reason for Stopping:         clonazePAM (KlonoPIN) 0.5 mg tablet Comments:   Reason for Stopping:               Unresulted Labs (24h ago, onward)    None        To obtain results of studies pending at discharge, please contact [phone]    Follow-up Information     Follow up With Specialties Details Why Contact Info    [facility]   Call on 4/27/2021  PLEASE call first before coming to our office. This will allow us to assess your needs in the safest way possible or reschedule your appointment. Intake Phone #: [phone]            Advanced Directive:   Does the patient have an appointed surrogate decision maker? No  Does the patient have a Medical Advance Directive? No  Does the patient have a Psychiatric Advance Directive? No  If the patient does not have a surrogate or Medical Advance Directive AND Psychiatric Advance Directive, the patient was offered information on these advance directives Patient declined to complete    Patient Instructions: Please continue all medications until otherwise directed by physician. Tobacco Cessation Discharge Plan:   Is the patient a smoker and needs referral for smoking cessation? Yes  Patient referred to the following for smoking cessation with an appointment? Refused     Patient was offered medication to assist with smoking cessation at discharge? Refused  Was education for smoking cessation added to the discharge instructions? Yes    Alcohol/Substance Abuse Discharge Plan:   Does the patient have a history of substance/alcohol abuse and requires a referral for treatment? Yes  Patient referred to the following for substance/alcohol abuse treatment with an appointment? Refused  Patient was offered medication to assist with alcohol cessation at discharge? Refused  Was education for substance/alcohol abuse added to discharge instructions? Yes    Patient discharged to Home; discussed with patient/caregiver and provided to the patient/caregiver either in hard copy or electronically.

## 2021-04-26 NOTE — SUICIDE SAFETY PLAN
SAFETY PLAN    A suicide Safety Plan is a document that supports someone when they are having thoughts of suicide. Warning Signs that indicate a suicidal crisis may be developing: What (situations, thoughts, feelings, body sensations, behaviors, etc.) do you experience that lets you know you are beginning to think about suicide? 1. Feeling alone  2. hopelessness      Internal Coping Strategies:  What things can I do (relaxation techniques, hobbies, physical activities, etc.) to take my mind off my problems without contacting another person? 1. [employer]  2. Taking a walk  3. tv    People and social settings that provide distraction: Who can I call or where can I go to distract me? 1. Name: sister in law  Phone: [employer]  2. Name: Abbi  Phone: in phone   3. Place: [employer]            4. Place: stores    People whom I can ask for help: Who can I call when I need help - for example, friends, family, clergy, someone else? 1. Name: sister in law                Phone: [employer]  2. Name: Abbi  Phone: in phone    Professionals or DEQ agencies I can contact during a crisis: Who can I call for help - for example, my doctor, my psychiatrist, my psychologist, a mental health provider, a suicide hotline? 1. Clinician Name: [address] [place], [place] [address]n Road [phone]  3. Suicide Prevention Lifeline: 2-189-115-TALK (9321)    4. [address] [place] [address] Emergency Services -  for example, [facility] suicide hotline, [address] [place] [address] Avenue: [facility]      Emergency Services Address: [address] [place] [address] Road [phone]    Making the environment safe: How can I make my environment (house/apartment/living space) safer? For example, can I remove guns, medications, and other items? 1.  Have contact information of assigned providers and  from [facility]   2. sobriety

## 2021-04-26 NOTE — BH NOTES
PRN Medication Documentation    Specific patient behavior that led to need for PRN medication: Pt expressed anxiety and insomnia. Staff interventions attempted prior to PRN being given: Reassurance. PRN medication given: 50 mg trazodone and 50 mg hydroxyzine PO. Patient response/effectiveness of PRN medication: Pt will alert staff if ineffective.

## 2021-04-26 NOTE — DISCHARGE INSTRUCTIONS
DISCHARGE SUMMARY    NAME:Pankaj Irene  : 1960  MRN: [id number]    The patient Marianela Mahmood exhibits the ability to control behavior in a less restrictive environment. Patient's level of functioning is improving. No assaultive/destructive behavior has been observed for the past 24 hours. No suicidal/homicidal threat or behavior has been observed for the past 24 hours. There is no evidence of serious medication side effects. Patient has not been in physical or protective restraints for at least the past 24 hours. If weapons involved, how are they secured? No weapons involved     Is patient aware of and in agreement with discharge plan? He requested to leave AMA     Arrangements for medication:no prescriptions given to patient. He was discharge AMA     Copy of discharge instructions to provider?:  Yes, fax to [address] [place] [address] for transportation home: lyft to the bus station     Keep all follow up appointments as scheduled, continue to take prescribed medications per physician instructions. Mental health crisis number:  175 or your local mental health crisis line number at [address]: [address] ADVISED  Stay healthy, face covering required when entering and while in the building. Due to the Coronavirus, we encourage people who are sick to stay home. If you are in need of services or have an appointment, and have had a fever in the last 24 hours,  or are feeling ill, or have had recent contact with someone who has tested positive for Covid-19,   PLEASE call first before coming to our office. This will allow us to assess your needs in the safest way possible or reschedule your appointment. Intake Phone #: [phone]  Scheduling Phone #: [phone]  Thank you for your help and attention in keeping everyone safe and reducing the spread of illness.       Mental Health Emergency WARM LINE      [phone] ([phone])      M-F: 9am to 9pm      Sat & Sun: 5pm - 9pm  National suicide prevention lines:                             2-305-XKXUYFF (8-650-109-863-805-2263)       1-301-156-TALK (2-952.338.9857)   24/7 Crisis Text Line:  Text HOME to 511541        DISCHARGE SUMMARY from Nurse    PATIENT INSTRUCTIONS:      What to do at Home:  Recommended activity: Activity as tolerated,       *  Please give a list of your current medications to your Primary Care Provider. *  Please update this list whenever your medications are discontinued, doses are      changed, or new medications (including over-the-counter products) are added. *  Please carry medication information at all times in case of emergency situations. These are general instructions for a healthy lifestyle:    No smoking/ No tobacco products/ Avoid exposure to second hand smoke  Surgeon General's Warning:  Quitting smoking now greatly reduces serious risk to your health. Obesity, smoking, and sedentary lifestyle greatly increases your risk for illness    A healthy diet, regular physical exercise & weight monitoring are important for maintaining a healthy lifestyle    You may be retaining fluid if you have a history of heart failure or if you experience any of the following symptoms:  Weight gain of 3 pounds or more overnight or 5 pounds in a week, increased swelling in our hands or feet or shortness of breath while lying flat in bed. Please call your doctor as soon as you notice any of these symptoms; do not wait until your next office visit. The discharge information has been reviewed with the patient. The patient verbalized understanding. Discharge medications reviewed with the patient and appropriate educational materials and side effects teaching were provided.   ___________________________________________________________________________________________________________________________________

## 2021-04-26 NOTE — PROGRESS NOTES
Problem: Depressed Mood (Adult/Pediatric)  Goal: *STG: Participates in treatment plan  Outcome: Progressing Towards Goal  Note: Out on unit engaged and participating in groups. CIWA unremarkable denies SI, requested d/c. AMA discharge order placed. Will d/c pt per order.    Goal: *STG: Verbalizes anger, guilt, and other feelings in a constructive manor  Outcome: Progressing Towards Goal  Goal: *STG: Attends activities and groups  Outcome: Progressing Towards Goal  Goal: *STG: Demonstrates reduction in symptoms and increase in insight into coping skills/future focused  Outcome: Progressing Towards Goal  Goal: *STG: Complies with medication therapy  Outcome: Progressing Towards Goal

## 2021-04-26 NOTE — PROGRESS NOTES
Problem: Falls - Risk of  Goal: *Absence of Falls  Description: Document Ai Farmer Fall Risk and appropriate interventions in the flowsheet. Outcome: Progressing Towards Goal  Note: Fall Risk Interventions:            Medication Interventions: Teach patient to arise slowly     Patient received, appears to be asleep, eyes closed, breathing even and unlabored. No signs of distress noted. Will continue to monitor for safety.

## 2021-04-26 NOTE — INTERDISCIPLINARY ROUNDS
Behavioral Health Interdisciplinary Rounds Patient Name: Rodolfo Walsh  Age: 63 y.o. Room/Bed:  727/02 Primary Diagnosis: <principal problem not specified> Admission Status: Voluntary Readmission within 30 days: no 
Power of  in place: no 
Patient requires a blocked bed: no          Reason for blocked bed: VTE Prophylaxis: No 
 
Mobility needs/Fall risk: no 
Flu Vaccine :   
Nutritional Plan: no 
Consults:         
Labs/Testing due today?: no 
 
Sleep hours:  6.5 Participation in Care/Groups:  no 
Medication Compliant?: Yes PRNS (last 24 hours): Antianxiety and Sleep Aid Restraints (last 24 hours):  no 
  
CIWA (range last 24 hours): CIWA-Ar Total: 0  
COWS (range last 24 hours): Alcohol screening (AUDIT) completed -   AUDIT Score: 27 If applicable, date SBIRT discussed in treatment team AND documented:  
AUDIT Screen Score: AUDIT Score: 27 Document Brief Intervention (corresponds directly with the 5 A's, Ask, Advise, Assess, Assist, and Arrange): At- Risk Patients (Score 7-15 for women; 8-15 for men) Discuss concern patient is drinking at unhealthy levels known to increase risk of alcohol-related health problems. Is Patient ready to commit to change? If No: 
 Encourage reflection  Discuss short term and long term health risks of consuming alcohol  Barriers to change  Reaffirm willingness to help / Educational materials provided If Yes: 
 Set goal 
 Plan  Educational materials provided Harmful use or Dependence (Score 16 or greater)  Discuss short term and long term health risks of consuming alcohol  Recommendations  Negotiate drinking goal 
 Recommend addiction specialist/center  Arrange follow-up appointments. Tobacco - patient is a smoker: Have You Used Tobacco in the Past 30 Days: Yes Illegal Drugs use: Have You Used Any Illegal Substances Over the Past 12 Months: Yes 
 
24 hour chart check complete: yes Patient goal(s) for today:  
Treatment team focus/goals: Discharge AMA Progress note : He is requesting discharge. LOS:  2  Expected LOS: Today Financial concerns/prescription coverage: no benefits Family contact:  No SADIA signed Family requesting physician contact today:  
Discharge plan: he [place] and go to [facility] Access to weapons :  no Outpatient provider(s): refer to [facility] in [facility] Patient's preferred phone number for follow up call :  
Patient's preferred e-mail address : 
Participating treatment team members: YINKA Short Dr., RN

## 2021-04-26 NOTE — BH NOTES
GROUP THERAPY PROGRESS NOTE    Patient did not participate in Process Group.      Jordan Kaye, Supervisee in Social Work

## 2021-04-26 NOTE — PROGRESS NOTES
Reviewed patient's BP readings ; increased lisinopril back to 40mg which is what he has been on in the past  Repeat labs from this am showing  Creatinine returned to normal  Patient Vitals for the past 12 hrs:   Temp Pulse Resp BP SpO2   04/25/21 2117 97.6 °F (36.4 °C) 72 16 (!) 158/89    04/25/21 1708 97.5 °F (36.4 °C) 75 20 (!) 149/107 96 %     BMP:   Lab Results   Component Value Date/Time     04/25/2021 06:42 AM    K 4.0 04/25/2021 06:42 AM     (H) 04/25/2021 06:42 AM    CO2 25 04/25/2021 06:42 AM    AGAP 6 04/25/2021 06:42 AM    GLU 94 04/25/2021 06:42 AM    GLU 94 04/25/2021 06:42 AM    BUN 26 (H) 04/25/2021 06:42 AM    CREA 1.23 04/25/2021 06:42 AM    GFRAA >60 04/25/2021 06:42 AM    GFRNA 60 (L) 04/25/2021 06:42 AM